# Patient Record
Sex: MALE | Race: BLACK OR AFRICAN AMERICAN | Employment: UNEMPLOYED | URBAN - METROPOLITAN AREA
[De-identification: names, ages, dates, MRNs, and addresses within clinical notes are randomized per-mention and may not be internally consistent; named-entity substitution may affect disease eponyms.]

---

## 2021-06-27 ENCOUNTER — HOSPITAL ENCOUNTER (EMERGENCY)
Age: 31
Discharge: HOME | End: 2021-06-28
Attending: EMERGENCY MEDICINE | Admitting: EMERGENCY MEDICINE
Payer: MEDICAID

## 2021-06-27 DIAGNOSIS — F43.10 PTSD (POST-TRAUMATIC STRESS DISORDER): ICD-10-CM

## 2021-06-27 DIAGNOSIS — R45.851 THREATENING SUICIDE: ICD-10-CM

## 2021-06-27 DIAGNOSIS — Z00.8 EVALUATION BY PSYCHIATRIC SERVICE REQUIRED: ICD-10-CM

## 2021-06-27 DIAGNOSIS — R45.1 RESTLESSNESS AND AGITATION: Primary | ICD-10-CM

## 2021-06-27 PROCEDURE — 99284 EMERGENCY DEPT VISIT MOD MDM: CPT

## 2021-06-27 RX ORDER — KETAMINE HYDROCHLORIDE 50 MG/ML
50 INJECTION, SOLUTION INTRAMUSCULAR; INTRAVENOUS ONCE
Status: DISCONTINUED | OUTPATIENT
Start: 2021-06-27 | End: 2021-06-28

## 2021-06-28 VITALS
TEMPERATURE: 97.9 F | DIASTOLIC BLOOD PRESSURE: 86 MMHG | HEART RATE: 89 BPM | RESPIRATION RATE: 18 BRPM | OXYGEN SATURATION: 96 % | SYSTOLIC BLOOD PRESSURE: 138 MMHG
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
## 2021-06-28 NOTE — ED NOTES
Faxed patients information to [facility]. Given patient a sandwich while he waits for his ride to come.

## 2021-06-28 NOTE — ED NOTES
Patient refusing to get lab work done or speak to a . Pt speaking to doctor and looking above his head. When asked what he is looking at patient stated \" your aura and you are looking the wrong way. \"
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
## 2021-06-28 NOTE — ED NOTES
..Discharge summary and discharge medications reviewed with patient and appropriate educational materials and side effects teaching were provided. patient  Given 0 paper prescriptions and 0 electronic prescriptions sent to pt's listed pharmacy. Patient  verbalized understanding of the importance of discussing medications with his or her physician or clinic they will be following up with. No si/s of acute distress prior to discharge. Patient offered wheelchair from treatment area to hospital entrance, patient declined wheelchair. Patients girlfriend is here to  patient.

## 2021-06-28 NOTE — DISCHARGE INSTRUCTIONS
You were seen after a head injury. After observation and/or imaging, we determined this is likely due to a concussion. Please avoid contact sports until cleared by your primary care doctor, get adequate sleep at night, avoid prolonged screentime (TV, Computer, Phone), take naps as needed. Make sure to follow up with a primary care doctor in 2-3 days. I also suggest following up with the [address] Please make an appointment here: [facility]. com/concussion-care-treatment/ or call: [phone]. If you develop any fevers, severe headaches, vomiting, changes in mental status, return to ER immediately. [address] [facility], [address] Providers    Accepts Insured Patients Only:  [facility]  [address]       [phone]   Near the corner of [facility] and [address] [facility] [address] in the near [facility]. Accepts most insurance including Medicaid/Medicare. No psychiatry. On the [facility] bus line. [address] [facility]. [phone]  [address] [facility] ([address]  [address] [facility]. [address] [facility], [address] [place])     [phone]   Accepts most major insurances. Psychiatry available. Some DBT groups. [facility]) [phone]   Mixture of psychologists and psychiatrists. They do not accept Medicaid or Medicare. [facility]  [address] [place] [address]       [phone]   Mixture of clinical social workers and psychologists. Sliding Scale/Financial Aid/Differing Payment Options  [facility]  [address] [facility]) [phone]   Our own [facility]. Variety of treatment options, including DBT. [place] [address]       [phone]   Provides a variety of group and individual counseling options.   Insurance, Medicaid, Medicare and sliding scale      Medicaid/Medicare providers in the [address] [facility]  [address] [place] [address]       [phone]    [facility]         [address]       [phone]    [place]  [address], [facility]    [phone] ex.  [address]     [phone]     [address] [place] [address]      [phone]      Services for patients without Medicaid, [place] or Insurance  The  [address]       [phone]   See handout in separate folder    An Macario Susana
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
## 2021-07-01 ENCOUNTER — HOSPITAL ENCOUNTER (EMERGENCY)
Age: 31
Discharge: HOME | End: 2021-07-01
Attending: EMERGENCY MEDICINE
Payer: MEDICAID

## 2021-07-01 VITALS
WEIGHT: 238.5 LBS | HEART RATE: 92 BPM | TEMPERATURE: 98.1 F | HEIGHT: 68 IN | SYSTOLIC BLOOD PRESSURE: 129 MMHG | RESPIRATION RATE: 20 BRPM | OXYGEN SATURATION: 98 % | BODY MASS INDEX: 36.15 KG/M2 | DIASTOLIC BLOOD PRESSURE: 85 MMHG

## 2021-07-01 DIAGNOSIS — K04.7 INFECTED DENTAL CARIES: Primary | ICD-10-CM

## 2021-07-01 DIAGNOSIS — Z72.0 TOBACCO ABUSE: ICD-10-CM

## 2021-07-01 DIAGNOSIS — K02.9 INFECTED DENTAL CARIES: Primary | ICD-10-CM

## 2021-07-01 PROCEDURE — 99283 EMERGENCY DEPT VISIT LOW MDM: CPT

## 2021-07-01 PROCEDURE — 74011000250 HC RX REV CODE- 250: Performed by: PHYSICIAN ASSISTANT

## 2021-07-01 PROCEDURE — 74011250637 HC RX REV CODE- 250/637: Performed by: PHYSICIAN ASSISTANT

## 2021-07-01 RX ORDER — IBUPROFEN 800 MG/1
800 TABLET ORAL
Qty: 20 TABLET | Refills: 0 | Status: SHIPPED | OUTPATIENT
Start: 2021-07-01 | End: 2021-07-08

## 2021-07-01 RX ORDER — PENICILLIN V POTASSIUM 500 MG/1
500 TABLET, FILM COATED ORAL 4 TIMES DAILY
Qty: 28 TABLET | Refills: 0 | Status: SHIPPED | OUTPATIENT
Start: 2021-07-01 | End: 2021-07-08

## 2021-07-01 RX ORDER — LIDOCAINE HYDROCHLORIDE 20 MG/ML
15 SOLUTION OROPHARYNGEAL AS NEEDED
Qty: 1 BOTTLE | Refills: 0 | Status: SHIPPED | OUTPATIENT
Start: 2021-07-01

## 2021-07-01 RX ORDER — ACETAMINOPHEN 500 MG
1000 TABLET ORAL
Qty: 20 TABLET | Refills: 0 | Status: SHIPPED | OUTPATIENT
Start: 2021-07-01

## 2021-07-01 RX ADMIN — BENZOCAINE, BUTAMBEN, AND TETRACAINE HYDROCHLORIDE: .028; .004; .004 AEROSOL, SPRAY TOPICAL at 16:29

## 2021-07-01 NOTE — ED PROVIDER NOTES
EMERGENCY DEPARTMENT HISTORY AND PHYSICAL EXAM      Date: 7/1/2021  Patient Name: Chantel Burn    History of Presenting Illness     Chief Complaint   Patient presents with    Dental Pain     History Provided By: Patient    HPI: Chantel Bradford, 31 y.o. male with medical history significant for tobacco abuse who presents via self to the ED with cc of acute moderate aching left lower dentalgia X 1 week. Endorses taking \"an oxy or something\" earlier today with minimal relief. Denies injury or trauma. No fever, chills, nausea, vomiting, drainage, trismus chest pain, shortness of breath, lightheadedness. Patient has not followed up with a dentist.    PCP: None    There are no other complaints, changes, or physical findings at this time. No current facility-administered medications on file prior to encounter. No current outpatient medications on file prior to encounter. Past History     Past Medical History:  History reviewed. No pertinent past medical history. Past Surgical History:  History reviewed. No pertinent surgical history. Family History:  History reviewed. No pertinent family history. Social History:  Social History     Tobacco Use    Smoking status: Current Some Day Smoker   Substance Use Topics    Alcohol use: Yes     Comment: beer    Drug use: Yes     Types: Marijuana     Allergies:  No Known Allergies  Review of Systems   Review of Systems   Constitutional: Negative for activity change, appetite change, chills, fatigue and fever. HENT: Positive for dental problem. Negative for congestion, drooling, ear pain, facial swelling, mouth sores, postnasal drip, rhinorrhea, sore throat, trouble swallowing and voice change. Eyes: Negative for pain and discharge. Respiratory: Negative. Negative for apnea, cough and shortness of breath. Cardiovascular: Negative. Negative for chest pain. Gastrointestinal: Negative.   Negative for abdominal pain, constipation, diarrhea, nausea and vomiting. Musculoskeletal: Negative. Negative for back pain, neck pain and neck stiffness. Skin: Negative. Negative for color change and rash. Neurological: Positive for headaches. Negative for dizziness, tremors, seizures, syncope, facial asymmetry, speech difficulty, weakness, light-headedness and numbness. Psychiatric/Behavioral: Negative. Negative for confusion. Physical Exam   Physical Exam  Vitals and nursing note reviewed. Constitutional:       General: He is not in acute distress. Appearance: Normal appearance. He is well-developed. He is not ill-appearing, toxic-appearing or diaphoretic. HENT:      Head: Normocephalic and atraumatic. Jaw: There is normal jaw occlusion. No trismus, tenderness, swelling, pain on movement or malocclusion. Right Ear: Hearing and external ear normal.      Left Ear: Hearing and external ear normal.      Nose: Nose normal.      Right Sinus: No maxillary sinus tenderness or frontal sinus tenderness. Left Sinus: No maxillary sinus tenderness or frontal sinus tenderness. Mouth/Throat:      Mouth: No oral lesions or angioedema. Dentition: Abnormal dentition. Does not have dentures. Dental tenderness (#18) and dental caries present. No gingival swelling, dental abscesses or gum lesions. Tongue: No lesions. Tongue does not deviate from midline. Palate: No mass and lesions. Pharynx: Oropharynx is clear. Uvula midline. No pharyngeal swelling, oropharyngeal exudate, posterior oropharyngeal erythema or uvula swelling. Tonsils: No tonsillar exudate or tonsillar abscesses. Eyes:      Conjunctiva/sclera: Conjunctivae normal.      Pupils: Pupils are equal, round, and reactive to light. Pulmonary:      Effort: Pulmonary effort is normal. No accessory muscle usage or respiratory distress. Musculoskeletal:         General: Normal range of motion. Cervical back: Normal range of motion.    Skin:     General: Skin is warm and dry. Coloration: Skin is not pale. Neurological:      Mental Status: He is alert and oriented to person, place, and time. Psychiatric:         Speech: Speech normal.         Behavior: Behavior normal.         Thought Content: Thought content normal.         Judgment: Judgment normal.       Diagnostic Study Results   Labs -   No results found for this or any previous visit (from the past 12 hour(s)). Radiologic Studies -   No orders to display     No results found. Medical Decision Making   I am the first provider for this patient. I reviewed the vital signs, available nursing notes, past medical history, past surgical history, family history and social history. Vital Signs-Reviewed the patient's vital signs. Patient Vitals for the past 24 hrs:   Temp Pulse Resp BP SpO2   07/01/21 1546 98.1 °F (36.7 °C) 92 20 (!) 150/100 98 %     Pulse Oximetry Analysis - 98% on RA (normal)    Records Reviewed: Nursing Notes, Old Medical Records, Previous Radiology Studies and Previous Laboratory Studies    Provider Notes (Medical Decision Making):   Patient presents with dental pain. No obvious abscess that needs drainage. No red flags that make PTA, RPA, ludwigs angina concerning. Will tx with dental ball, antibiotics and outpatient analgesics. Given information on dentists and importance of followup and no smoking. ED Course:   Initial assessment performed. The patients presenting problems have been discussed, and they are in agreement with the care plan formulated and outlined with them. I have encouraged them to ask questions as they arise throughout their visit. TOBACCO COUNSELING:  Upon evaluation, pt expressed that they are a current tobacco user. Pt has been counseled on the dangers of smoking and was encouraged to quit as soon as possible in order to decrease further risks to their health.  Pt has conveyed their understanding of the risks involved should they continue to use tobacco products. 4 min discussion. Progress Note:   Updated pt on all returned results and findings. Discussed the importance of proper follow up as referred below along with return precautions. Pt in agreement with the care plan and expresses agreement with and understanding of all items discussed. Disposition:  4:13 PM  I have discussed with patient their diagnosis, treatment, and follow up plan. The patient agrees to follow up as outlined in discharge paperwork and also to return to the ED with any worsening. Anita Hawkins PA-C      PLAN:  1. Current Discharge Medication List      START taking these medications    Details   penicillin v potassium (VEETID) 500 mg tablet Take 1 Tablet by mouth four (4) times daily for 7 days. Qty: 28 Tablet, Refills: 0  Start date: 7/1/2021, End date: 7/8/2021      acetaminophen (TYLENOL) 500 mg tablet Take 2 Tablets by mouth every six (6) hours as needed for Pain. Qty: 20 Tablet, Refills: 0  Start date: 7/1/2021      ibuprofen (MOTRIN) 800 mg tablet Take 1 Tablet by mouth every six (6) hours as needed for Pain for up to 7 days. Qty: 20 Tablet, Refills: 0  Start date: 7/1/2021, End date: 7/8/2021      lidocaine (Lidocaine Viscous) 2 % solution Take 15 mL by mouth as needed for Pain. Qty: 1 Bottle, Refills: 0  Start date: 7/1/2021           2. Follow-up Information     Follow up With Specialties Details Why [address]  Schedule an appointment as soon as possible for a visit in 1 day As needed [address]  [phone]        Return to ED if worse     Diagnosis     Clinical Impression:   1. Infected dental caries    2. Tobacco abuse            Please note that this dictation was completed with Dragon, computer voice recognition software. Quite often unanticipated grammatical, syntax, homophones, and other interpretive errors are inadvertently transcribed by the computer software.   Please disregard these errors. Additionally, please excuse any errors that have escaped final proofreading.

## 2021-07-01 NOTE — DISCHARGE INSTRUCTIONS
It was a pleasure taking care of you in our Emergency Department today. We know that when you come to [facility], you are entrusting us with your health, comfort, and safety. Our physicians and nurses honor that trust, and truly appreciate the opportunity to care for you and your loved ones. We also value your feedback. If you receive a survey about your Emergency Department experience today, please fill it out. We care about our patients' feedback, and we listen to what you have to say. Thank you! Emergency [address] by [facility]  [address], [address]  Open M, W, F: 8AM - 5PM and T, Th: 8AM-6PM  Phone: [phone], press 4  $70 for Emergency Care  $60 for first routine care, then pay by sliding scale based upon income. [facility]  [address], [address]  Phone: [phone]    [address], [address] [place] [address]  Phone: [phone]  Fee: $75 Routine Extraction, $125 Complex Extraction  Open Monday - Friday 8AM - 5:00PM    [facility]  [address], [address]  Open Monday - Friday 8AM - 4:30 PM  Phone: [phone] of Dentistry Urgent [address] [facility], [address] [place] [address], 1st Floor  [facility] starting at 8:30 AM M-F  Phone: [phone], press 2  Fee: $150 per tooth (x-ray & extractions only)  Pediatrics Phone[de-identified] [phone], 8-5 M-F    [address] Dentistry, [address] [facility], [facility], 2nd Floor, [address] [place] [address] starting at 8:30 AM - 3 PM [address] [place] [address], [address] [place] [address]  Phone: [phone] or [phone]  Emergency Hours: 9:30AM - 11AM (extractions)  Simple tooth extraction $ per tooth.  #75 for x-ray    [facility] only, over the age of 25  Phone: [phone]. Leave message saying you need an appointment to register.   Hours: Tuesday Evenings

## 2021-07-01 NOTE — ED NOTES
Pt presents ambulatory to ED complaining of lower left dental pain. Pt reports taking a pill his girlfriend had \"an oxy or something\" 4 hours PTA. Pt is alert and oriented x 4, RR even and unlabored, skin is warm and dry. Assesment completed and pt updated on plan of care. Emergency Department Nursing Plan of Care       The Nursing Plan of Care is developed from the Nursing assessment and Emergency Department Attending provider initial evaluation. The plan of care may be reviewed in the ED Provider note.     The Plan of Care was developed with the following considerations:   Patient / Family readiness to learn indicated by:verbalized understanding  Persons(s) to be included in education: patient  Barriers to Learning/Limitations:No    [address]    7/1/2021   4:03 PM

## 2021-08-21 ENCOUNTER — HOSPITAL ENCOUNTER (EMERGENCY)
Age: 31
Discharge: ANOTHER HOSPITAL | End: 2021-08-21
Attending: EMERGENCY MEDICINE
Payer: MEDICAID

## 2021-08-21 ENCOUNTER — APPOINTMENT (OUTPATIENT)
Dept: GENERAL RADIOLOGY | Age: 31
End: 2021-08-21
Attending: EMERGENCY MEDICINE
Payer: MEDICAID

## 2021-08-21 VITALS
TEMPERATURE: 97.6 F | SYSTOLIC BLOOD PRESSURE: 147 MMHG | WEIGHT: 236 LBS | OXYGEN SATURATION: 99 % | RESPIRATION RATE: 18 BRPM | HEART RATE: 99 BPM | DIASTOLIC BLOOD PRESSURE: 95 MMHG | HEIGHT: 68 IN | BODY MASS INDEX: 35.77 KG/M2
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
## 2021-08-21 PROCEDURE — 73552 X-RAY EXAM OF FEMUR 2/>: CPT

## 2021-08-21 PROCEDURE — 99284 EMERGENCY DEPT VISIT MOD MDM: CPT

## 2021-08-21 PROCEDURE — 96374 THER/PROPH/DIAG INJ IV PUSH: CPT

## 2021-08-21 PROCEDURE — 74011250636 HC RX REV CODE- 250/636: Performed by: EMERGENCY MEDICINE

## 2021-08-21 RX ORDER — FENTANYL CITRATE 50 UG/ML
50 INJECTION, SOLUTION INTRAMUSCULAR; INTRAVENOUS
Status: COMPLETED | OUTPATIENT
Start: 2021-08-21 | End: 2021-08-21

## 2021-08-21 RX ADMIN — FENTANYL CITRATE 50 MCG: 50 INJECTION, SOLUTION INTRAMUSCULAR; INTRAVENOUS at 00:32
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
## 2023-05-14 RX ORDER — ACETAMINOPHEN 500 MG
1000 TABLET ORAL EVERY 6 HOURS PRN
COMMUNITY
Start: 2021-07-01

## 2023-05-14 RX ORDER — LIDOCAINE HYDROCHLORIDE 20 MG/ML
15 SOLUTION OROPHARYNGEAL PRN
COMMUNITY
Start: 2021-07-01